# Patient Record
Sex: MALE | Race: WHITE | NOT HISPANIC OR LATINO | Employment: OTHER
[De-identification: names, ages, dates, MRNs, and addresses within clinical notes are randomized per-mention and may not be internally consistent; named-entity substitution may affect disease eponyms.]

---

## 2017-01-09 ENCOUNTER — RECORDS - HEALTHEAST (OUTPATIENT)
Dept: ADMINISTRATIVE | Facility: OTHER | Age: 57
End: 2017-01-09

## 2017-03-29 ENCOUNTER — AMBULATORY - HEALTHEAST (OUTPATIENT)
Dept: ADMINISTRATIVE | Facility: REHABILITATION | Age: 57
End: 2017-03-29

## 2017-03-29 ENCOUNTER — RECORDS - HEALTHEAST (OUTPATIENT)
Dept: ADMINISTRATIVE | Facility: OTHER | Age: 57
End: 2017-03-29

## 2017-03-29 DIAGNOSIS — M53.3 COCCYGEAL PAIN: ICD-10-CM

## 2017-04-03 ENCOUNTER — OFFICE VISIT - HEALTHEAST (OUTPATIENT)
Dept: PHYSICAL THERAPY | Facility: CLINIC | Age: 57
End: 2017-04-03

## 2017-04-03 DIAGNOSIS — M53.3 COCCYDYNIA: ICD-10-CM

## 2017-04-03 DIAGNOSIS — M62.89 MUSCLE TIGHTNESS: ICD-10-CM

## 2017-04-05 ENCOUNTER — OFFICE VISIT - HEALTHEAST (OUTPATIENT)
Dept: PHYSICAL THERAPY | Facility: REHABILITATION | Age: 57
End: 2017-04-05

## 2017-04-05 DIAGNOSIS — M53.3 COCCYDYNIA: ICD-10-CM

## 2017-04-05 DIAGNOSIS — M62.89 MUSCLE TIGHTNESS: ICD-10-CM

## 2017-04-11 ENCOUNTER — OFFICE VISIT - HEALTHEAST (OUTPATIENT)
Dept: PHYSICAL THERAPY | Facility: REHABILITATION | Age: 57
End: 2017-04-11

## 2017-04-11 DIAGNOSIS — M62.89 MUSCLE TIGHTNESS: ICD-10-CM

## 2017-04-11 DIAGNOSIS — M53.3 COCCYDYNIA: ICD-10-CM

## 2017-04-13 ENCOUNTER — OFFICE VISIT - HEALTHEAST (OUTPATIENT)
Dept: PHYSICAL THERAPY | Facility: CLINIC | Age: 57
End: 2017-04-13

## 2017-04-13 DIAGNOSIS — M53.3 COCCYDYNIA: ICD-10-CM

## 2017-04-13 DIAGNOSIS — M62.89 MUSCLE TIGHTNESS: ICD-10-CM

## 2017-06-07 ENCOUNTER — OFFICE VISIT - HEALTHEAST (OUTPATIENT)
Dept: PHYSICAL THERAPY | Facility: CLINIC | Age: 57
End: 2017-06-07

## 2017-06-07 DIAGNOSIS — M53.3 COCCYDYNIA: ICD-10-CM

## 2017-06-07 DIAGNOSIS — M62.89 MUSCLE TIGHTNESS: ICD-10-CM

## 2017-06-15 ENCOUNTER — OFFICE VISIT - HEALTHEAST (OUTPATIENT)
Dept: PHYSICAL THERAPY | Facility: CLINIC | Age: 57
End: 2017-06-15

## 2017-06-15 DIAGNOSIS — M62.89 MUSCLE TIGHTNESS: ICD-10-CM

## 2017-06-15 DIAGNOSIS — M53.3 COCCYDYNIA: ICD-10-CM

## 2017-06-19 ENCOUNTER — OFFICE VISIT - HEALTHEAST (OUTPATIENT)
Dept: PHYSICAL THERAPY | Facility: CLINIC | Age: 57
End: 2017-06-19

## 2017-06-19 DIAGNOSIS — M53.3 COCCYDYNIA: ICD-10-CM

## 2017-06-19 DIAGNOSIS — M62.89 MUSCLE TIGHTNESS: ICD-10-CM

## 2017-06-22 ENCOUNTER — OFFICE VISIT - HEALTHEAST (OUTPATIENT)
Dept: PHYSICAL THERAPY | Facility: CLINIC | Age: 57
End: 2017-06-22

## 2017-06-22 DIAGNOSIS — M53.3 COCCYDYNIA: ICD-10-CM

## 2017-06-22 DIAGNOSIS — M62.89 MUSCLE TIGHTNESS: ICD-10-CM

## 2017-07-03 ENCOUNTER — OFFICE VISIT - HEALTHEAST (OUTPATIENT)
Dept: PHYSICAL THERAPY | Facility: REHABILITATION | Age: 57
End: 2017-07-03

## 2017-07-03 DIAGNOSIS — M62.89 MUSCLE TIGHTNESS: ICD-10-CM

## 2017-07-03 DIAGNOSIS — M53.3 COCCYDYNIA: ICD-10-CM

## 2018-07-27 ENCOUNTER — RECORDS - HEALTHEAST (OUTPATIENT)
Dept: ADMINISTRATIVE | Facility: OTHER | Age: 58
End: 2018-07-27

## 2018-10-09 ENCOUNTER — RECORDS - HEALTHEAST (OUTPATIENT)
Dept: ADMINISTRATIVE | Facility: OTHER | Age: 58
End: 2018-10-09

## 2021-05-27 ENCOUNTER — RECORDS - HEALTHEAST (OUTPATIENT)
Dept: ADMINISTRATIVE | Facility: CLINIC | Age: 61
End: 2021-05-27

## 2021-05-29 ENCOUNTER — RECORDS - HEALTHEAST (OUTPATIENT)
Dept: ADMINISTRATIVE | Facility: CLINIC | Age: 61
End: 2021-05-29

## 2021-06-09 NOTE — PROGRESS NOTES
[facility]   Initial Evaluation    Patient Name: Nicko Xiong  Date of evaluation: 4/3/2017  Referral Diagnosis: Coccygeal pain  Referring provider: Lucio Nicole MD  Visit Diagnosis:     ICD-10-CM    1. Coccydynia M53.3    2. Muscle tightness M62.89        Assessment:       Pt with some increased tone in the pelvic floor. Pt with tenderness on both sides of the coccyx.  Pt with increased tone in the obturator internus on the L. Pt did leave Eval/Rx with no pain this date.  Pt. is appropriate for skilled PT intervention as outlined in the Plan of Care (POC).    Goals:  Pt. will demonstrate/verbalize independence in self-management of condition in : 4 weeks  Pt. will be independent with home exercise program in : 4 weeks  Patient will sit: 30 minutes;for driving;for work;for eating;for watching TV;for toileting;with less pain;with less difficultty;in 4 weeks  No Data Recorded    Patient's expectations/goals are realistic.    Barriers to Learning or Achieving Goals:  No Barriers.       Plan / Patient Instructions:        Plan of Care:   Communication with: Referral Source  Patient Related Instruction: Nature of Condition;Treatment plan and rationale;Self Care instruction;Basis of treatment;Posture;Precautions;Next steps;Expected outcome  Times per Week: 2  Number of Visits: 4  Discharge Planning: When indicated with HEP or on this PT last day at this facility  Therapeutic Exercise: Pelvic Floor retraining  Neuromuscular Reeducation: posture;core  Manual Therapy: soft tissue mobilization;myofascial release;joint mobilization;muscle energy;strain counterstrain;visceral manipulation    Plan for next visit: Assess if internal releases helpful     Subjective:         Social information:   Living Situation:single family home, lives with others  and stairs  with railing   Occupation:Consultant   Work Status:Working full time   Equipment Available: None    History of Present Illness:    Nicko is a 56 y.o.  "male who presents to therapy today with complaints of coccygeal pain especially with sitting longer than 30 mins. Pt notes he has significant pain with sitting and driving his car. Pt notes he does travel out of state weekly for work. He notes he has back issue at L4 in which he has had some radic to the L  4 and 5th toes. MRI of sacral/coccyx region was negative per pt. He notes in the last 3 mos pain with erections at the head of the penis. This pain resolves as soon as the penis is flaccid. Pt does use a \"doughnut\" pillow at times which can help to take away some of his pain. He also has a sit-stand desk so he changes positions every 60 mins when working.      Pain Ratin}  Pain rating at best: 1  Pain rating at worst: 8  Pain description: aching, burning, numbness, pain, sharp, shooting and tingling    Functional limitations are described as occurring with:   sitting longer than 30 mins    Patient reports benefit from:  limited sitting       Objective:        Examination  1. Coccydynia     2. Muscle tightness       Involved side: L>R  Posture Observation:      General sitting posture is  fair.  General standing posture is fair.  Lumbopelvic complex: Pelvic alignment: pelvic landmarks level this date with decreased moblity noted in the sacrum     Pelvis  Bladder: Mobility minimally decreased  Prostate: Mobility moderately decreased  Rectum: Mobility moderately decreased    Pt with mod myofascial tone and tightness in the lower abdominal region.  Pt with mod-marked myofascial tone and tightness in the lumbosacral region. Poor sacral spring is noted this date.    Tenderness is noted externally over the L lev ani, Obturator internus, and coccyx.    Rectal exam show increased EAS tone. Puborectalis with mod-marked tone as well as the L ob internus.   TP's noted in the lev ani, ob internus,puborectalis this date.    Treatment Today     TREATMENT MINUTES COMMENTS   Evaluation 25 Abdominal region, pelvic floor "   Self-care/ Home management     Manual therapy 30 Vis mobs ext to bladder and prostate x 15', TP release to pelvic [id number]   Neuromuscular Re-education     Therapeutic Activity     Therapeutic Exercises     Gait training     Modality__________________                Total 55    Blank areas are intentional and mean the treatment did not include these items.     PT Evaluation Code: (Please list factors)  Patient History/Comorbidities: High BMI, Pilonidal cyst  Examination: pelvic floor and abdomin  Clinical Presentation: stable  Clinical Decision Making: low    Patient History/  Comorbidities Examination  (body structures and functions, activity limitations, and/or participation restrictions) Clinical Presentation Clinical Decision Making (Complexity)   No documented Comorbidities or personal factors 1-2 Elements Stable and/or uncomplicated Low   1-2 documented comorbidities or personal factor 3 Elements Evolving clinical presentation with changing characteristics Moderate   3-4 documented comorbidities or personal factors 4 or more Unstable and unpredictable High          Daniella Rico PT  4/3/2017  8:15 AM

## 2021-06-09 NOTE — PROGRESS NOTES
[facility] Daily Progress     Patient Name: Nicko Xiong  Date: 4/5/2017  Visit #: 2  Referral Diagnosis: Coccygeal pain  Referring provider:Lucio Nicole MD    Visit Diagnosis:     ICD-10-CM    1. Coccydynia M53.3    2. Muscle tightness M62.89          Assessment:     HEP/POC compliance is  good .  Patient is benefitting from skilled physical therapy and is making steady progress toward functional goals.    Goal Status:  Pt. will demonstrate/verbalize independence in self-management of condition in : 4 weeks  Pt. will be independent with home exercise program in : 4 weeks  Patient will sit: 30 minutes;for driving;for work;for eating;for watching TV;for toileting;with less pain;with less difficultty;in 4 weeks  No Data Recorded    Plan / Patient Education:     Continue with initial plan of care.  Progress with home program as tolerated.    Subjective:     Pain Rating: 3  Pt reports the sharp intense pain has been gone since the eval 2 days ago.  Pain is now more achy. Pt also late because he thought the appt was at 7:30 vs 7.      Objective:     A slight sacral torsion is noted this date. Pt with deep R sacral sulci with L LEONCIO high.  Poor sacral spring is noted but improved after correction of the sacral torsion. Tenderness is noted externally over the L lev ani, Obturator internus, and coccyx.  Rectal exam show increased EAS tone. Puborectalis with mod-marked tone as well as the L ob internus. TP's noted in the lev ani, ob internus,puborectalis this date.    Treatment Today     TREATMENT MINUTES COMMENTS   Evaluation     Self-care/ Home management     Manual therapy 30 STM/MFR with sacral float x 15' prone, TP release rectally x15'   Neuromuscular Re-education     Therapeutic Activity     Therapeutic Exercises     Gait training     Modality__________________                Total 30 Pt late for the appt   Blank areas are intentional and mean the treatment did not include these items.       Daniella HOLLIS  Trisha PT  4/5/2017

## 2021-06-10 NOTE — PROGRESS NOTES
[facility] Daily Progress     Patient Name: Nicko Xiong  Date: 2017  Visit #: 3  Referral Diagnosis: Coccygeal pain  Referring provider:Lucio Nicole MD    Visit Diagnosis:     ICD-10-CM    1. Coccydynia M53.3    2. Muscle tightness M62.89          Assessment:     HEP/POC compliance is  good .  Patient is benefitting from skilled physical therapy and is making steady progress toward functional goals.    Goal Status:  Pt. will demonstrate/verbalize independence in self-management of condition in : 4 weeks  Pt. will be independent with home exercise program in : 4 weeks  Patient will sit: 30 minutes;for driving;for work;for eating;for watching TV;for toileting;with less pain;with less difficultty;in 4 weeks  No Data Recorded    Plan / Patient Education:     Continue with initial plan of care.  Progress with home program as tolerated.    Subjective:     Pain Ratin  Pt reports the sharp intense pain has been gone since the eval.  Pain is now more achy. Pt notes the pain is more in the PM vs the AM      Objective:     No sacral torsion is noted this date.  Poor sacral spring is noted but improved after MFR/STM. Tenderness is noted externally over the L lev ani, Obturator internus, and coccyx.  Rectal exam show increased EAS tone. Puborectalis with mod tone as well as the L ob internus. TP's noted in the lev ani, ob internus,puborectalis this date.    Treatment Today     TREATMENT MINUTES COMMENTS   Evaluation     Self-care/ Home management     Manual therapy 50 STM/MFR with sacral float x 35' prone, TP release rectally x15'   Neuromuscular Re-education     Therapeutic Activity     Therapeutic Exercises     Gait training     Modality__________________                Total 50    Blank areas are intentional and mean the treatment did not include these items.       Daniella Rico PT  2017

## 2021-06-10 NOTE — PROGRESS NOTES
[facility] Daily Progress     Patient Name: Nicko Xiong  Date: 2017  Visit #: 4  Referral Diagnosis: Coccygeal pain  Referring provider:Lucio Nicole MD    Visit Diagnosis:     ICD-10-CM    1. Coccydynia M53.3    2. Muscle tightness M62.89          Assessment:     Pt overall with decreased tone in the pelvic floor since the start of PT.  Pt responds well to TP release rectally to puborectalis on the R. Most of the increased tone is noted in the superficial layers of the lev ani.  Some of his pain is increased with driving and extended periods of sitting.  HEP/POC compliance is  good .  Patient is benefitting from skilled physical therapy and is making steady progress toward functional goals.    Goal Status:  Pt. will demonstrate/verbalize independence in self-management of condition in : 4 weeks  Pt. will be independent with home exercise program in : 4 weeks  Patient will sit: 30 minutes;for driving;for work;for eating;for watching TV;for toileting;with less pain;with less difficultty;in 4 weeks  No Data Recorded    Plan / Patient Education:     Continue with initial plan of care.  Pt will transfer care to [facility] with Dickson VICENTE    Subjective:     Pain Ratin  Pt reports the sharp intense pain conts to be min since the eval.  Pain is now more achy. Pt reports no pain after his last Rx but returned with the drive from [place] to [place].      Objective:     No sacral torsion is noted this date.  Poor sacral spring is noted but improved after MFR/STM. Tenderness is noted externally over the L lev ani, Obturator internus, and coccyx.  Rectal exam show increased EAS tone. Puborectalis with mod tone with R>L. TP's noted in the lev ani, ob internus,puborectalis this date.    Treatment Today     TREATMENT MINUTES COMMENTS   Evaluation     Self-care/ Home management     Manual therapy 55 STM/MFR with sacral float x 40' prone, TP release rectally x15'   Neuromuscular Re-education      Therapeutic Activity     Therapeutic Exercises     Gait training     Modality__________________                Total 55    Blank areas are intentional and mean the treatment did not include these items.       Daniella Rico PT  4/13/2017

## 2021-06-11 NOTE — PROGRESS NOTES
[facility]   Initial Evaluation    Patient Name: Nicko Xiong  Date of evaluation: 2017  Referral Diagnosis: Coccygeal pain  Referring provider: Pablo Hanson  Visit Diagnosis:     ICD-10-CM    1. Coccydynia M53.3    2. Muscle tightness M62.89        Assessment:      Pt. is appropriate for skilled PT intervention as outlined in the Plan of Care (POC).  Pt. is a good candidate for skilled PT services to improve pain levels and function.    Goals:  Pt. will demonstrate/verbalize independence in self-management of condition in : 4 weeks  Pt. will be independent with home exercise program in : 4 weeks  Patient will sit: 30 minutes;for driving;for work;for eating;for watching TV;for toileting;with less pain;with less difficultty;in 4 weeks  No Data Recorded    Patient's expectations/goals are realistic.    Barriers to Learning or Achieving Goals:  Co-morbidities or other medical factors.  Diabetes       Plan / Patient Instructions:        Plan of Care:   No Data Recorded    Plan for next visit: Initiate PT per POC     Subjective:         Social information:   Living Situation:apartment, lives with others  and stairs  with railing   Occupation: and self-employed  Technical    Work Status:Working full time   Equipment Available: None    History of Present Illness:    Nicko is a 56 y.o. male who presents to therapy today with complaints of Coccyx pain. Date of onset/duration of symptoms is . Onset was gradual. Symptoms are intermittent and getting better. He denies history of similar symptoms. He describes their previous level of function as not limited    Pain Ratin}  Pain rating at best: 0  Pain rating at worst: 10  Pain description: burning    Functional limitations are described as occurring with: Sitting causes worsening of the symptoms  No other affects present    Patient reports benefit from:  rest  , pain medication, physical therapy       Objective:       Patient Outcome Measures :    No Data Recorded   Scores range from 0-80, where a score of 80 represents maximum function. The minimal clinically important difference is a positive change of 9 points.    Examination  1. Coccydynia     2. Muscle tightness       Involved side: Bilateral  Posture Observation:      General sitting posture is  fair.Slumped in the chair in pain avoidance from the coccyx pressure  General standing posture is fair.  Forward head and shoulders   Slight thoracic kyphosis developing     4 sec / cycle.   Sphenoid restriction.   R SI upslip. With R Illium Ant Rotation.   Visceral resteiction to the Colon,  Prostate gland      Cranial Scan  + at Visceral  Ant, post pelvis,   Ant pelvis , L sacrum Post Neuro.   Ligamentous Pelvis L.         Treatment Today     TREATMENT MINUTES COMMENTS   Evaluation 30    Self-care/ Home management     Manual therapy 30 MFR with OA, L5-SI and SI joint releases, Dural Tube Pull.    Sphenoid release.   SCS to FILM-N B,   IMP-N B,   PS2-N R, L,   PS3-N R.    Burning sensation is gone.   Pain 0/10.   Pt will call re how long the relief lasts   Neuromuscular Re-education     Therapeutic Activity     Therapeutic Exercises     Gait training     Modality__________________                Total 60    Blank areas are intentional and mean the treatment did not include these items.         PT Evaluation Code: (Please list factors)  Patient History/Comorbidities:2  Diabetis,  Breathing difficulties  Examination:Ligamentous,  Lymphatic, Neurological secondary manefesting in coccyx pain  Clinical Presentation:evolving  Clinical Decision Making: moderate    Patient History/  Comorbidities Examination  (body structures and functions, activity limitations, and/or participation restrictions) Clinical Presentation Clinical Decision Making (Complexity)   No documented Comorbidities or personal factors 1-2 Elements Stable and/or uncomplicated Low   1-2 documented comorbidities or  personal factor 3 Elements Evolving clinical presentation with changing characteristics Moderate   3-4 documented comorbidities or personal factors 4 or more Unstable and unpredictable High         Dickson Ortiz  6/7/2017  7:28 AM

## 2021-06-11 NOTE — PROGRESS NOTES
[facility] Daily Progress     Patient Name: Nicko Xiong  Date: 6/15/2017  Visit #:     Referral Diagnosis: Coccyx pain  Referring provider: Solomon Prasad,*  Visit Diagnosis:     ICD-10-CM    1. Coccydynia M53.3    2. Muscle tightness M62.89          Assessment:     Patient demonstrates understanding/independence with home program.  Patient is benefitting from skilled physical therapy and is making steady progress toward functional goals.  Patient is appropriate to continue with skilled physical therapy intervention, as indicated by initial plan of care.    Goal Status:  Pt. will demonstrate/verbalize independence in self-management of condition in : 6 weeks  Pt. will be independent with home exercise program in : 4 weeks  Pt. will decrease use of medication for pain for improved quality of life in : 6 weeks  Pt. will have improved quality of sleep: waking less times/night;with less pain;in 6 weeks  Patient will sit: 30 minutes;for driving;for work;for other activity;with less pain;with less difficultty;in 6 weeks  No Data Recorded    Plan / Patient Education:     Continue with initial plan of care.    Subjective:     Pain Ratin-2/10 today  Yesterday 8/10      Objective:     R SI tension,   Sphenoid restriction    Treatment Today     TREATMENT MINUTES COMMENTS   Evaluation     Self-care/ Home management     Manual therapy 60 MFR with OA, L5-SI and SI joint releases, Dural Tube Pull   SCS to BENNETT R,   LGLS-A R,   SCS to COCX-N R,   OI-N R,   PUD-N,   SGL-N R,   SLCUN-N R,   MCULN-N R,  TFGL90-D R,   ILL1-N R,   OBTL3-N R,   SCIL4-N R,   SCIL5-NR,   Ventral Root of S1,3 R,     Releases achieved.   Tender points resolved  Pain levels resolved  0/10     Neuromuscular Re-education     Therapeutic Activity     Therapeutic Exercises     Gait training     Modality__________________                Total 60    Blank areas are intentional and mean the treatment did not include these items.        Dickson Ortiz  6/15/2017

## 2021-06-11 NOTE — PROGRESS NOTES
[facility] Daily Progress     Patient Name: Nicko Xiong  Date: 2017  Visit #:     Referral Diagnosis:Coccyx  Referring provider: Solomon Prasad,*  Visit Diagnosis:     ICD-10-CM    1. Coccydynia M53.3    2. Muscle tightness M62.89          Assessment:     Patient demonstrates understanding/independence with home program.  Patient is benefitting from skilled physical therapy and is making steady progress toward functional goals.  Patient is appropriate to continue with skilled physical therapy intervention, as indicated by initial plan of care.    Goal Status:  Pt. will demonstrate/verbalize independence in self-management of condition in : 6 weeks  Pt. will be independent with home exercise program in : 4 weeks  Pt. will decrease use of medication for pain for improved quality of life in : 6 weeks  Pt. will have improved quality of sleep: waking less times/night;with less pain;in 6 weeks  Patient will sit: 30 minutes;for driving;for work;for other activity;with less pain;with less difficultty;in 6 weeks  No Data Recorded    Plan / Patient Education:     Continue with initial plan of care.    Subjective:     Pain Ratin-1/.10  increases as day progresses 5/10      Objective:     Seat solutions seating helps a great deal.   Pt has developed a R shoulder pain with possible return of his Frozen shoulder issue    Treatment Today     TREATMENT MINUTES COMMENTS   Evaluation     Self-care/ Home management     Manual therapy 15 MFR with OA, L5-SI and SI joint releases, Dural Tube Pull,  Sphenoid release   Neuromuscular Re-education     Therapeutic Activity     Therapeutic Exercises 45 Seated scapular release and mobilization per handout.  Scapular depresion and retraction stretch and strengthening with theraband,   Side lying breathing release of spine soft tissue,   Anterior rib stretching with towel    Releases achieved Pt Ind with exercises  Pt will require a no of months to mobilize  and relax his trunk soft tissue and stretch out the postural tightness he possesses   Gait training     Modality__________________                Total 60    Blank areas are intentional and mean the treatment did not include these items.       Dickson Ortiz  6/22/2017

## 2021-06-11 NOTE — PROGRESS NOTES
[facility] Discharge Summary    Patient Name: Nicko Xiong  Date: 6/7/2017  Referral Diagnosis: [unfilled]  Referring provider: Solomon Prasad,*  Visit Diagnosis:   1. Coccydynia     2. Muscle tightness         Goals:  Pt. will demonstrate/verbalize independence in self-management of condition in : 6 weeks  Pt. will be independent with home exercise program in : 4 weeks  Pt. will decrease use of medication for pain for improved quality of life in : 6 weeks  Pt. will have improved quality of sleep: waking less times/night;with less pain;in 6 weeks  Patient will sit: 30 minutes;for driving;for work;for other activity;with less pain;with less difficultty;in 6 weeks  No Data Recorded    Patient was seen for 4 visits from 4/3/17 to 4/13/17 with0 missed appointments.  The patient attended therapy initially, but did not finish the therapy sessions prescribed.  Goals were not fully achieved. Explanation for goals not achieved: Physical Therapist celeste be leaving the area.  Pt will need to be seen at [facility]    Therapy will be discontinued at this time.  The patient will need a new referral to resume.    Thank you for your referral.  Dickson Ortiz  6/7/2017  5:21 PM

## 2021-06-11 NOTE — PROGRESS NOTES
[facility] Daily Progress     Patient Name: Nicko Xiong  Date: 6/19/2017  Visit #: 3/12    Referral Diagnosis: Coccyx pain  Referring provider: Solomon Prasad,*  Visit Diagnosis:     ICD-10-CM    1. Coccydynia M53.3    2. Muscle tightness M62.89          Assessment:     Patient demonstrates understanding/independence with home program.  Patient is benefitting from skilled physical therapy and is making steady progress toward functional goals.  Patient is appropriate to continue with skilled physical therapy intervention, as indicated by initial plan of care.    Goal Status:  Pt. will demonstrate/verbalize independence in self-management of condition in : 6 weeks  Pt. will be independent with home exercise program in : 4 weeks  Pt. will decrease use of medication for pain for improved quality of life in : 6 weeks  Pt. will have improved quality of sleep: waking less times/night;with less pain;in 6 weeks  Patient will sit: 30 minutes;for driving;for work;for other activity;with less pain;with less difficultty;in 6 weeks  No Data Recorded    Plan / Patient Education:     Continue with initial plan of care.    Subjective:     Pain Rating: 3  If it flares up  Its a 7-8/10      Objective:        4 sec / cycle,   Tender points to the Sacrum and coccyx areas    Treatment Today     TREATMENT MINUTES COMMENTS   Evaluation     Self-care/ Home management     Manual therapy 60 MFR with OA, L5-SI and SI joint releases, Dural Tube Pull.    SCS to MSAC-A,   BLS-A   SGLS-A,   IGL-A   PS-A,   COCX-N,   OI-N L,   PUD-N,   PCUT-N,       Releases achieved.   Pain levels reduced to 0/10.   Burning sensation is resolved   Neuromuscular Re-education     Therapeutic Activity     Therapeutic Exercises     Gait training     Modality__________________                Total 60    Blank areas are intentional and mean the treatment did not include these items.       Dickson Ortiz  6/19/2017

## 2021-06-11 NOTE — PROGRESS NOTES
[facility] Daily Progress     [facility] Discharge Summary    Patient Name: Nicko Xiong  Date: 9/7/2017  Referral Diagnosis: [unfilled]  Referring provider: Lucio Nicole MD  Visit Diagnosis:   1. Coccydynia     2. Muscle tightness         Goals:  No Data Recorded  No Data Recorded    Patient was seen for 5 visits from 6/7/17 to 7/3/17 with 0 missed appointments.  The patient met goals and has demonstrated understanding of and independence in the home program for self-care, and progression to next steps.  He will initiate contact if questions or concerns arise.  Patient received a home program Use of proper sitting posture and surfaces    Therapy will be discontinued at this time.  The patient will need a new referral to resume.    Thank you for your referral.  Dickson Ortiz  9/7/2017  3:25 PM  Patient Name: Nicko Xiong  Date: 7/3/2017  Visit #: 5/12    Referral Diagnosis: Coccyx pain  Referring provider: Lucio Nicole MD  Visit Diagnosis:     ICD-10-CM    1. Coccydynia M53.3    2. Muscle tightness M62.89          Assessment:     Patient demonstrates understanding/independence with home program.  Patient is benefitting from skilled physical therapy and is making steady progress toward functional goals.  Patient is appropriate to continue with skilled physical therapy intervention, as indicated by initial plan of care.    Goal Status:  Pt. will demonstrate/verbalize independence in self-management of condition in : 6 weeks  Pt. will be independent with home exercise program in : 4 weeks  Pt. will decrease use of medication for pain for improved quality of life in : 6 weeks  Pt. will have improved quality of sleep: waking less times/night;with less pain;in 6 weeks  Patient will sit: 30 minutes;for driving;for work;for other activity;with less pain;with less difficultty;in 6 weeks  No Data Recorded    Plan / Patient Education:     Continue with initial plan of  care.    Subjective:     Pain Ratin  Burning sensation later in the day      Objective:     Flexed forward posture    Treatment Today     TREATMENT MINUTES COMMENTS   Evaluation     Self-care/ Home management     Manual therapy 30 MFR with OA, L5-SI and SI joint releases, Dural Tube Pull   SCS FILM-N L,   PGLS-N L,   IMP-N L,   PS2-N L,      Tender points resolved    Pt standing and walking more erect  Pain 0/10  Pt instructed in sitting posture and appropriate surfaces   Neuromuscular Re-education     Therapeutic Activity     Therapeutic Exercises     Gait training     Modality__________________                Total 30    Blank areas are intentional and mean the treatment did not include these items.       Dickson Ortiz  7/3/2017

## 2024-10-02 ENCOUNTER — TELEPHONE (OUTPATIENT)
Dept: CONSULT | Facility: CLINIC | Age: 64
End: 2024-10-02
Payer: COMMERCIAL

## 2024-10-02 ENCOUNTER — VIRTUAL VISIT (OUTPATIENT)
Dept: CONSULT | Facility: CLINIC | Age: 64
End: 2024-10-02
Payer: COMMERCIAL

## 2024-10-02 DIAGNOSIS — Z84.89 FAMILY HISTORY OF GENETIC DISEASE: Primary | ICD-10-CM

## 2024-10-02 DIAGNOSIS — E11.9 DIABETES MELLITUS (H): Primary | ICD-10-CM

## 2024-10-02 DIAGNOSIS — E11.9 DIABETES MELLITUS (H): ICD-10-CM

## 2024-10-02 DIAGNOSIS — Z84.89 FAMILY HISTORY OF GENETIC DISEASE: ICD-10-CM

## 2024-10-02 PROCEDURE — 999N000069 HC STATISTIC GENETIC COUNSELING, < 16 MIN: Mod: TEL,95

## 2024-10-02 NOTE — LETTER
10/2/2024      RE: Nicko Xiong  [address] [address]     Dear Colleague,    Thank you for the opportunity to participate in the care of your patient, Nicko Xiong, at the [facility] at [facility]. Please see a copy of my visit note below.    Name:  Nicko Xiong  :   1960  MRN:   [id number]  Date of service: Oct 2, 2024  Primary Provider: Solomon Prasad    Presenting information  I spoke with Nicko to discuss targeted testing for a variant in GCK that was identified in his daughter and granddaughter. Nicko has a history of diabetes. He was first diagnosed about 12 years ago, around age 52. Now, he has no insulin production and requires full medication therapy. He has limited family history information, and thus, is not aware of other affected relatives.    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 Insulin is a hormone that regulates blood sugar levels in the body. It is produced by the pancreas. Insulin secretion lowers blood sugar, whereas an insufficient amount of insulin causes high blood sugar. In some individuals, a GCK-related condition will present with hyperinsulinism (excess insulin), which leads to hypoglycemia (low blood sugar). Other individuals will have SEJAL, which usually manifests in childhood or early adulthood and is characterized by hyperglycemia (high blood sugar) due to abnormal insulin secretion. Few people with GCK-SEJAL require insulin therapy.    Today we reviewed targeted testing for the variant identified in his daughter and granddaughter. Because there is a known familial variant for which to test, we can pursue targeted testing. This is the most efficient means of obtaining or excluding a  genetic diagnosis of GCK-SEJAL due to the familial variant. This testing will look for the presence or absence of the familial variant and any other pathogenic variants in the gene. The potential results were discussed: positive or a negative.    One possibility is that the familial variant is detected. This is considered a positive result.  A positive result provides more information on appropriate clinical management and may provide information on additional potential health risks associated with this diagnosis.   We can sometimes, but rarely, identify a different pathogenic variant.  It is also possible that the familial variant is not detected.  This is considered a negative result.  A negative result would rule out a diagnosis of GCK-SEJAL due to the familial variant to the best of our ability. This does not exclude all genetic diagnoses.    Management and surveillance of Nicko will depend on the genetic test results. Given that Nicko has diabetes, this may not significantly change his management, but could help inform the approach and prognosis. BRENDA reviewed today. We reviewed sample collection. Testing is no charge per [facility].    Nicko consented to genetic testing and declined further questions.  Consent form was signed with his verbal permission.    Plan  Targeted testing for GCK-related SEJAL.  Blood draw in [place] discussed.  Results will be returned in approximately 2-3 weeks via phone.   No further questions.  Contact information provided      Petrona Carey MS, [facility]  Genetic Counselor  Division of Genetics and Metabolism  [facility]  Office: [phone]  Fax: [phone]      Time spent in consultation: 15 min       Please do not hesitate to contact me if you have any questions/concerns.     Sincerely,       Petrona Carey GC

## 2024-10-02 NOTE — TELEPHONE ENCOUNTER
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 He expressed interest in pursuing testing, and the details of this will be discussed upon his return call after discussing with his wife. Details will be documented upon doing so.     Petrona Carey MS, [facility]  Genetic Counselor  Division of Genetics and Metabolism  [facility]  Office: [phone]  Fax: [phone]

## 2024-10-03 ENCOUNTER — LAB (OUTPATIENT)
Dept: LAB | Facility: CLINIC | Age: 64
End: 2024-10-03
Payer: COMMERCIAL

## 2024-10-03 DIAGNOSIS — Z84.89 FAMILY HISTORY OF GENETIC DISEASE: ICD-10-CM

## 2024-10-03 DIAGNOSIS — E11.9 DIABETES MELLITUS (H): ICD-10-CM

## 2024-10-03 PROCEDURE — 36415 COLL VENOUS BLD VENIPUNCTURE: CPT

## 2024-10-03 PROCEDURE — 99000 SPECIMEN HANDLING OFFICE-LAB: CPT

## 2024-10-03 NOTE — PROGRESS NOTES
Name:  Nicko Xinog  :   1960  MRN:   [id number]  Date of service: Oct 2, 2024  Primary Provider: Solomon Prasad    Presenting information  I spoke with Nicko to discuss targeted testing for a variant in GCK that was identified in his daughter and granddaughter. Nicko has a history of diabetes. He was first diagnosed about 12 years ago, around age 52. Now, he has no insulin production and requires full medication therapy. He has limited family history information, and thus, is not aware of other affected relatives.    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 Insulin is a hormone that regulates blood sugar levels in the body. It is produced by the pancreas. Insulin secretion lowers blood sugar, whereas an insufficient amount of insulin causes high blood sugar. In some individuals, a GCK-related condition will present with hyperinsulinism (excess insulin), which leads to hypoglycemia (low blood sugar). Other individuals will have SEJAL, which usually manifests in childhood or early adulthood and is characterized by hyperglycemia (high blood sugar) due to abnormal insulin secretion. Few people with GCK-SEJAL require insulin therapy.    Today we reviewed targeted testing for the variant identified in his daughter and granddaughter. Because there is a known familial variant for which to test, we can pursue targeted testing. This is the most efficient means of obtaining or excluding a genetic diagnosis of GCK-SEJAL due to the familial variant. This testing will look for the presence or absence of the familial variant and any other pathogenic variants in the gene. The potential results were discussed: positive or a negative.    One possibility is that the familial variant is detected. This is considered a positive result.  A positive result  provides more information on appropriate clinical management and may provide information on additional potential health risks associated with this diagnosis.   We can sometimes, but rarely, identify a different pathogenic variant.  It is also possible that the familial variant is not detected.  This is considered a negative result.  A negative result would rule out a diagnosis of GCK-SEJAL due to the familial variant to the best of our ability. This does not exclude all genetic diagnoses.    Management and surveillance of Nicko will depend on the genetic test results. Given that Nicko has diabetes, this may not significantly change his management, but could help inform the approach and prognosis. BRENDA reviewed today. We reviewed sample collection. Testing is no charge per [facility].    Nicko consented to genetic testing and declined further questions.  Consent form was signed with his verbal permission.    Plan  Targeted testing for GCK-related SEJAL.  Blood draw in [place] discussed.  Results will be returned in approximately 2-3 weeks via phone.   No further questions.  Contact information provided      Petrona Carey MS, [facility]  Genetic Counselor  Division of Genetics and Metabolism  [facility]  Office: [phone]  Fax: [phone]      Time spent in consultation: 15 min

## 2024-10-04 LAB
PERFORMING LABORATORY: NORMAL
SPECIMEN STATUS: NORMAL
TEST NAME: NORMAL

## 2024-10-11 ENCOUNTER — TELEPHONE (OUTPATIENT)
Dept: CONSULT | Facility: CLINIC | Age: 64
End: 2024-10-11
Payer: COMMERCIAL

## 2024-10-11 LAB
SCANNED LAB RESULT: NORMAL
TEST NAME: NORMAL

## 2024-10-11 NOTE — TELEPHONE ENCOUNTER
October 11, 2024    I called Nicko regarding the results of his recent genetic testing for GCK-SEJAL familial variant.    His testing was NEGATIVE. The familial deletion was not identified, nor any other likely pathogenic or pathogenic variants.    Petrona Galvin MS, [facility]  Genetic Counselor  Division of Genetics and Metabolism  [facility]  Office: [phone]  Fax: [phone]